# Patient Record
Sex: FEMALE | Race: WHITE | NOT HISPANIC OR LATINO | URBAN - NONMETROPOLITAN AREA
[De-identification: names, ages, dates, MRNs, and addresses within clinical notes are randomized per-mention and may not be internally consistent; named-entity substitution may affect disease eponyms.]

---

## 2020-09-05 ENCOUNTER — APPOINTMENT (OUTPATIENT)
Dept: GENERAL RADIOLOGY | Facility: HOSPITAL | Age: 25
End: 2020-09-05

## 2020-09-05 ENCOUNTER — HOSPITAL ENCOUNTER (EMERGENCY)
Facility: HOSPITAL | Age: 25
Discharge: HOME | End: 2020-09-05
Attending: EMERGENCY MEDICINE | Admitting: EMERGENCY MEDICINE

## 2020-09-05 VITALS
WEIGHT: 180 LBS | TEMPERATURE: 98.6 F | BODY MASS INDEX: 31.89 KG/M2 | HEIGHT: 63 IN | RESPIRATION RATE: 18 BRPM | HEART RATE: 87 BPM | OXYGEN SATURATION: 100 %

## 2020-09-05 DIAGNOSIS — S80.11XA CONTUSION OF LEG, RIGHT, INITIAL ENCOUNTER: Primary | ICD-10-CM

## 2020-09-05 DIAGNOSIS — T14.8XXA ABRASION: ICD-10-CM

## 2020-09-05 PROCEDURE — 90471 IMMUNIZATION ADMIN: CPT | Performed by: EMERGENCY MEDICINE

## 2020-09-05 PROCEDURE — 99284 EMERGENCY DEPT VISIT MOD MDM: CPT

## 2020-09-05 PROCEDURE — 25010000002 TDAP 5-2.5-18.5 LF-MCG/0.5 SUSPENSION: Performed by: EMERGENCY MEDICINE

## 2020-09-05 PROCEDURE — 73590 X-RAY EXAM OF LOWER LEG: CPT

## 2020-09-05 PROCEDURE — 90715 TDAP VACCINE 7 YRS/> IM: CPT | Performed by: EMERGENCY MEDICINE

## 2020-09-05 RX ORDER — CEPHALEXIN 500 MG/1
500 CAPSULE ORAL 4 TIMES DAILY
Qty: 28 CAPSULE | Refills: 0 | Status: SHIPPED | OUTPATIENT
Start: 2020-09-05

## 2020-09-05 RX ORDER — NORETHINDRONE ACETATE AND ETHINYL ESTRADIOL AND FERROUS FUMARATE 5-7-9-7
1 KIT ORAL DAILY
COMMUNITY

## 2020-09-05 RX ORDER — IBUPROFEN 800 MG/1
800 TABLET ORAL ONCE
Status: COMPLETED | OUTPATIENT
Start: 2020-09-05 | End: 2020-09-05

## 2020-09-05 RX ORDER — HYDROCODONE BITARTRATE AND ACETAMINOPHEN 5; 325 MG/1; MG/1
1 TABLET ORAL ONCE
Status: COMPLETED | OUTPATIENT
Start: 2020-09-05 | End: 2020-09-05

## 2020-09-05 RX ADMIN — IBUPROFEN 800 MG: 800 TABLET, FILM COATED ORAL at 17:13

## 2020-09-05 RX ADMIN — TETANUS TOXOID, REDUCED DIPHTHERIA TOXOID AND ACELLULAR PERTUSSIS VACCINE, ADSORBED 0.5 ML: 5; 2.5; 8; 8; 2.5 SUSPENSION INTRAMUSCULAR at 17:11

## 2020-09-05 RX ADMIN — HYDROCODONE BITARTRATE AND ACETAMINOPHEN 1 TABLET: 5; 325 TABLET ORAL at 17:13

## 2020-09-05 NOTE — ED PROVIDER NOTES
Subjective   History of Present Illness    Chief Complaint: Lower extremity pain status post fall  History of Present Illness: 24-year-old female presents with a right lower extremity pain across her shin with abrasion and contusion status post fall while hiking  Onset: Just prior to arrival  Duration: Single episode with persistent symptoms  Exacerbating / Alleviating factors: Attempted weightbearing and range of motion  Associated symptoms: None      Nurses Notes reviewed and agree, including vitals, allergies, social history and prior medical history.     REVIEW OF SYSTEMS: All systems reviewed and not pertinent unless noted.    Positive for: Right lower extremity pain with contusion and abrasion status post fall    Negative for: Deformity numbness weakness  Review of Systems    Past Medical History:   Diagnosis Date   • Insomnia    • Migraine        Allergies   Allergen Reactions   • Penicillins Confusion       Past Surgical History:   Procedure Laterality Date   • WISDOM TOOTH EXTRACTION         History reviewed. No pertinent family history.    Social History     Socioeconomic History   • Marital status: Single     Spouse name: Not on file   • Number of children: Not on file   • Years of education: Not on file   • Highest education level: Not on file   Tobacco Use   • Smoking status: Never Smoker   Substance and Sexual Activity   • Alcohol use: Yes     Comment: occasional   • Drug use: Never   • Sexual activity: Defer           Objective   Physical Exam  GENERAL APPEARANCE: Well developed, well nourished, 24-year-old white female,  in no acute distress, mildly uncomfortable.  VITAL SIGNS: per nursing, reviewed and noted  SKIN: Exposed skin with nonbleeding abrasion 4 cm without active bleeding longitudinally oriented along the right anterior shin.  Associated surrounding contusion  Head: Normocephalic, atraumatic.   EYES:  EOMI.  ENT: Normal voice.  Patient maintained wearing mask throughout patient encounter  due to coronavirus pandemic  LUNGS: No increased work of breathing. No retractions.   CARDIOVASCULAR: Good Peripheral pulses. Good capillary refill. Pink and warm extremities.   MUSCULOSKELETAL: No compartment syndrome. Intact sensation tenderness palpation of the area aforementioned contusion and abrasion of the right shin.  Distal pulses good cap refill.  No calf tenderness no ankle tenderness no knee tenderness  NEUROLOGIC: Alert, oriented x 3. No gross deficits. GCS 15.   NECK: Supple, symmetric. No tenderness, Full ROM  Psychiatric: normal affect.   Back: full rom, no paraspinal spasm.     Procedures     No attending physician procedures were performed on this patient.      ED Course         Right tibia-fibula interpreted by me reveals no fracture no dislocation.  Soft tissue small radiopaque possible foreign body in the anterior soft tissues.                                  MDM  Physical examination not consistent with the soft tissue radiopaque and possible foreign body seen in the patient exam.  There is no puncture.  No active bleeding.  Superficial abrasions.  Will add Keflex, updated tetanus vaccine, provided crutches at patient request.  Norco and Motrin here.  Final diagnoses:   Contusion of leg, right, initial encounter   Abrasion            Sean Hardin, DO  09/05/20 [id number]

## 2020-09-05 NOTE — ED NOTES
Washed pts wound with hibiclens and normal saline. Placed a non-adherent dressing and secured with tape.     Anahy Serrano RN  09/05/20 1092